# Patient Record
Sex: FEMALE | Race: BLACK OR AFRICAN AMERICAN | HISPANIC OR LATINO
[De-identification: names, ages, dates, MRNs, and addresses within clinical notes are randomized per-mention and may not be internally consistent; named-entity substitution may affect disease eponyms.]

---

## 2018-03-15 ENCOUNTER — TRANSCRIPTION ENCOUNTER (OUTPATIENT)
Age: 43
End: 2018-03-15

## 2023-04-12 ENCOUNTER — NON-APPOINTMENT (OUTPATIENT)
Age: 48
End: 2023-04-12

## 2023-06-19 PROBLEM — Z00.00 ENCOUNTER FOR PREVENTIVE HEALTH EXAMINATION: Status: ACTIVE | Noted: 2023-06-19

## 2023-06-26 ENCOUNTER — APPOINTMENT (OUTPATIENT)
Dept: UROLOGY | Facility: CLINIC | Age: 48
End: 2023-06-26
Payer: MEDICARE

## 2023-06-26 ENCOUNTER — NON-APPOINTMENT (OUTPATIENT)
Age: 48
End: 2023-06-26

## 2023-06-26 VITALS
SYSTOLIC BLOOD PRESSURE: 114 MMHG | HEART RATE: 121 BPM | TEMPERATURE: 97.3 F | BODY MASS INDEX: 32.59 KG/M2 | OXYGEN SATURATION: 97 % | HEIGHT: 60 IN | DIASTOLIC BLOOD PRESSURE: 82 MMHG | WEIGHT: 166 LBS

## 2023-06-26 DIAGNOSIS — R35.0 FREQUENCY OF MICTURITION: ICD-10-CM

## 2023-06-26 PROCEDURE — 81003 URINALYSIS AUTO W/O SCOPE: CPT | Mod: QW

## 2023-06-26 PROCEDURE — 99204 OFFICE O/P NEW MOD 45 MIN: CPT

## 2023-06-26 PROCEDURE — 51798 US URINE CAPACITY MEASURE: CPT

## 2023-06-26 NOTE — REVIEW OF SYSTEMS
[Feeling Tired] : feeling tired [Eyesight Problems] : eyesight problems [see HPI] : see HPI [Limb Weakness] : limb weakness [Difficulty Walking] : difficulty walking [Anxiety] : anxiety [Depression] : depression [Muscle Weakness] : muscle weakness [Feelings Of Weakness] : feelings of weakness [Negative] : Heme/Lymph

## 2023-06-26 NOTE — ADDENDUM
[FreeTextEntry1] : A portion of this note was written by [Stanislav Avina] on 06/26/2023 acting as a scribe for Dr. Rodríguez. \par \par I have personally reviewed the chart and agree that the record accurately reflects my personal performance of the history, physical exam, assessment, and plan.\par

## 2023-06-26 NOTE — ASSESSMENT
[FreeTextEntry1] : I discussed the findings and options with Ms. LILIA MALCOLM in detail.\par \par Options for management of the patient's overactive bladder and incontinence discussed at length. These included medical therapy, behavioral modification, bladder retraining, and surgical options. Surgical options for third line therapies reviewed included injection of botulinum toxin versus sacral nerve stimulation therapy. We discussed anticholinergic vs. beta agonist medications in great detail with risks and benefits.\par \par As she has had no improvement with Anticholinergics we agreed she will start a trial of Beta agonist and if no improvement, she will stop it 2-3 days prior to Urodynamic testing. \par \par Pt will return in office for urodynamics \par \par All of Ms. Malcolm's questions were answered. Patient expressed understanding.\par \par

## 2023-06-26 NOTE — PHYSICAL EXAM

## 2023-06-26 NOTE — HISTORY OF PRESENT ILLNESS
[FreeTextEntry1] : Ms. LILIA MARIE  comes in today for her initial urologic evaluation. Pt is a 49 yo F  with long standing hx of OAB.  Her symptoms have worsened over the last year.  She was seen by Dr. Mario Goins 2 months ago and was started on Trospium which she took for a month.  It did not help her so it was switched to Oxybutynin.  She states this is not helping her either.   She complains of urinary frequency, urgency and UI.  She has 3-4 episodes of nocturia per night.  Pt reports occasionally wearing incontinence pads for protection.   She denies "UTI symmptoms".  She is not sexually active. 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 \par \par \par Sono (performed to assess bladder emptying): 1cc\par Udip: 2023 ---negative\par \par PMH: Mitochondrial disease, HTN, Hypothyroidism, Depression, Anxiety\par PSH: none\par FH: denies \par SocHx: non-smoker, no alcohol\par \par Meds: Amlodipine, Doxazosin, Venlafaxine, Levothyroxine, Drospirenone\par \par Habits: 2 large bottles of water, no caffeine\par

## 2023-06-26 NOTE — LETTER BODY
[Dear  ___] : Dear  [unfilled], [Consult Letter:] : I had the pleasure of evaluating your patient, [unfilled]. [Please see my note below.] : Please see my note below. [Consult Closing:] : Thank you very much for allowing me to participate in the care of this patient.  If you have any questions, please do not hesitate to contact me. [Sincerely,] : Sincerely, [FreeTextEntry3] : Dr. Allison Rodríguez\par

## 2023-06-27 LAB
APPEARANCE: CLEAR
BACTERIA: ABNORMAL /HPF
BILIRUB UR QL STRIP: NORMAL
BILIRUBIN URINE: NEGATIVE
BLOOD URINE: NEGATIVE
CAST: 5 /LPF
CLARITY UR: CLEAR
COLLECTION METHOD: NORMAL
COLOR: NORMAL
EPITHELIAL CELLS: 49 /HPF
GLUCOSE QUALITATIVE U: NEGATIVE MG/DL
GLUCOSE UR-MCNC: NORMAL
HCG UR QL: 0.2 EU/DL
HGB UR QL STRIP.AUTO: NORMAL
KETONES UR-MCNC: NORMAL
KETONES URINE: NEGATIVE MG/DL
LEUKOCYTE ESTERASE UR QL STRIP: NORMAL
LEUKOCYTE ESTERASE URINE: NEGATIVE
MICROSCOPIC-UA: NORMAL
NITRITE UR QL STRIP: NORMAL
NITRITE URINE: NEGATIVE
PH UR STRIP: 5.5
PH URINE: 5.5
PROT UR STRIP-MCNC: NORMAL
PROTEIN URINE: NORMAL MG/DL
RED BLOOD CELLS URINE: 2 /HPF
SP GR UR STRIP: >=1.03
SPECIFIC GRAVITY URINE: 1.03
UROBILINOGEN URINE: 1 MG/DL
WHITE BLOOD CELLS URINE: 2 /HPF

## 2023-06-29 LAB — BACTERIA UR CULT: NORMAL

## 2023-07-06 ENCOUNTER — APPOINTMENT (OUTPATIENT)
Dept: UROLOGY | Facility: CLINIC | Age: 48
End: 2023-07-06
Payer: MEDICARE

## 2023-07-06 VITALS — HEART RATE: 122 BPM | DIASTOLIC BLOOD PRESSURE: 85 MMHG | SYSTOLIC BLOOD PRESSURE: 145 MMHG | OXYGEN SATURATION: 98 %

## 2023-07-06 PROCEDURE — 51701 INSERT BLADDER CATHETER: CPT

## 2023-07-06 NOTE — HISTORY OF PRESENT ILLNESS
[FreeTextEntry1] : Pt is a 49 yo F who presents today for a straight catheterization. Urine culture from  shows >=3 organisms. Probable collection contamination. \par \par Of note: Patient pending urodynamics on 8/3\par \par \par 23--Ms. LILIA MARIE comes in today for her initial urologic evaluation. Pt is a 49 yo F  with long standing hx of OAB. Her symptoms have worsened over the last year. She was seen by Dr. Mario Goins 2 months ago and was started on Trospium which she took for a month. It did not help her so it was switched to Oxybutynin. She states this is not helping her either. She complains of urinary frequency, urgency and UI. She has 3-4 episodes of nocturia per night. Pt reports occasionally wearing incontinence pads for protection. She denies "UTI symmptoms". She is not sexually active. 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 \par \par \par Sono (performed to assess bladder emptying): 1cc\par Udip: 2023 ---negative\par \par PMH: Mitochondrial disease, HTN, Hypothyroidism, Depression, Anxiety\par PSH: none\par FH: denies \par SocHx: non-smoker, no alcohol\par \par Meds: Amlodipine, Doxazosin, Venlafaxine, Levothyroxine, Drospirenone\par \par Habits: 2 large bottles of water, no caffeine\par \par

## 2023-07-06 NOTE — ASSESSMENT
[FreeTextEntry1] : Pt is a 47 yo F who presents today for a straight catheterization. Urine culture from June 26th shows >=3 organisms. Probable collection contamination. \par \par \par Urine culture sent.

## 2023-07-10 LAB — BACTERIA UR CULT: NORMAL

## 2023-08-03 ENCOUNTER — APPOINTMENT (OUTPATIENT)
Dept: UROLOGY | Facility: CLINIC | Age: 48
End: 2023-08-03
Payer: MEDICARE

## 2023-08-03 VITALS
TEMPERATURE: 97.8 F | SYSTOLIC BLOOD PRESSURE: 135 MMHG | DIASTOLIC BLOOD PRESSURE: 89 MMHG | OXYGEN SATURATION: 98 % | HEART RATE: 127 BPM

## 2023-08-03 PROCEDURE — 51728 CYSTOMETROGRAM W/VP: CPT

## 2023-08-03 PROCEDURE — 51797 INTRAABDOMINAL PRESSURE TEST: CPT

## 2023-08-03 PROCEDURE — 81003 URINALYSIS AUTO W/O SCOPE: CPT | Mod: QW

## 2023-08-03 PROCEDURE — 51784 ANAL/URINARY MUSCLE STUDY: CPT

## 2023-08-03 PROCEDURE — 51741 ELECTRO-UROFLOWMETRY FIRST: CPT

## 2023-08-07 LAB
BACTERIA UR CULT: NORMAL
BILIRUB UR QL STRIP: NORMAL
CLARITY UR: CLEAR
COLLECTION METHOD: NORMAL
GLUCOSE UR-MCNC: NORMAL
HCG UR QL: 0.2 EU/DL
HGB UR QL STRIP.AUTO: NORMAL
KETONES UR-MCNC: NORMAL
LEUKOCYTE ESTERASE UR QL STRIP: NORMAL
NITRITE UR QL STRIP: NORMAL
PH UR STRIP: 5.5
PROT UR STRIP-MCNC: NORMAL
SP GR UR STRIP: 1.03

## 2023-08-09 NOTE — ASSESSMENT
[FreeTextEntry1] : Pt is a 47 yo F who presents today for urodynamics. She has a long standing hx of OAB that has worsened over the years. Pt has trialed Trospium and oxybutynin in the past with little to no improvement in her symptoms.   Patient has decreased bladder capacity. Patient experiencing detrusor instability.  The detrusor contractility is normal.   6 weeks samples of Gemtesa provided to patient.  She will f/u in office to assess her symptoms. If no improvement, we will discuss options including SNM and botox.

## 2023-08-09 NOTE — HISTORY OF PRESENT ILLNESS
[FreeTextEntry1] : Pt is a 47 yo F who presents today for urodynamics. She has a long standisng hx of OAB that has worsened over the years. Pt has trialed Trospium and oxybutynin in the past with little to no improvement in her symptoms. She reports 75% improvement in her night time frequency while on Gemtesa,  however her daytime symptoms are still quite bothersome.   23--Pt is a 47 yo F who presents today for a straight catheterization. Urine culture from  shows >=3 organisms. Probable collection contamination.  Of note: Patient pending urodynamics on 8/3  6/26/23--Ms. LILIA MARIE comes in today for her initial urologic evaluation. Pt is a 47 yo F  with long standing hx of OAB. Her symptoms have worsened over the last year. She was seen by Dr. Mario Goins 2 months ago and was started on Trospium which she took for a month. It did not help her so it was switched to Oxybutynin. She states this is not helping her either. She complains of urinary frequency, urgency and UI. She has 3-4 episodes of nocturia per night. Pt reports occasionally wearing incontinence pads for protection. She denies "UTI symmptoms". She is not sexually active.    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      Sono (performed to assess bladder emptying): 1cc  Udip: 2023 ---negative    PMH: Mitochondrial disease, HTN, Hypothyroidism, Depression, Anxiety  PSH: none  FH: denies  SocHx: non-smoker, no alcohol    Meds: Amlodipine, Doxazosin, Venlafaxine, Levothyroxine, Drospirenone    Habits: 2 large bottles of water, no caffeine

## 2023-09-14 ENCOUNTER — APPOINTMENT (OUTPATIENT)
Dept: UROLOGY | Facility: CLINIC | Age: 48
End: 2023-09-14
Payer: MEDICARE

## 2023-09-14 VITALS
DIASTOLIC BLOOD PRESSURE: 82 MMHG | OXYGEN SATURATION: 96 % | HEART RATE: 109 BPM | TEMPERATURE: 97.2 F | SYSTOLIC BLOOD PRESSURE: 136 MMHG

## 2023-09-14 PROCEDURE — 64561 IMPLANT NEUROELECTRODES: CPT | Mod: 50

## 2023-09-19 ENCOUNTER — APPOINTMENT (OUTPATIENT)
Dept: UROLOGY | Facility: CLINIC | Age: 48
End: 2023-09-19
Payer: MEDICARE

## 2023-09-19 VITALS — TEMPERATURE: 97.5 F

## 2023-09-19 PROCEDURE — 99215 OFFICE O/P EST HI 40 MIN: CPT

## 2023-09-26 RX ORDER — VIBEGRON 75 MG/1
75 TABLET, FILM COATED ORAL
Qty: 30 | Refills: 1 | Status: ACTIVE | COMMUNITY
Start: 2023-09-26 | End: 1900-01-01

## 2023-10-05 ENCOUNTER — TRANSCRIPTION ENCOUNTER (OUTPATIENT)
Age: 48
End: 2023-10-05

## 2023-10-05 ENCOUNTER — APPOINTMENT (OUTPATIENT)
Dept: UROLOGY | Facility: CLINIC | Age: 48
End: 2023-10-05
Payer: MEDICARE

## 2023-10-05 PROCEDURE — 99447 NTRPROF PH1/NTRNET/EHR 11-20: CPT

## 2023-10-09 ENCOUNTER — APPOINTMENT (OUTPATIENT)
Dept: UROLOGY | Facility: CLINIC | Age: 48
End: 2023-10-09

## 2023-11-07 ENCOUNTER — APPOINTMENT (OUTPATIENT)
Dept: UROLOGY | Facility: CLINIC | Age: 48
End: 2023-11-07
Payer: MEDICARE

## 2023-11-07 VITALS
DIASTOLIC BLOOD PRESSURE: 67 MMHG | SYSTOLIC BLOOD PRESSURE: 118 MMHG | HEART RATE: 133 BPM | TEMPERATURE: 96.4 F | OXYGEN SATURATION: 98 %

## 2023-11-07 LAB
BILIRUB UR QL STRIP: NORMAL
CLARITY UR: CLEAR
COLLECTION METHOD: NORMAL
GLUCOSE UR-MCNC: NORMAL
HCG UR QL: 0.2 EU/DL
HGB UR QL STRIP.AUTO: NORMAL
KETONES UR-MCNC: NORMAL
LEUKOCYTE ESTERASE UR QL STRIP: NORMAL
NITRITE UR QL STRIP: NORMAL
PH UR STRIP: 5.5
PROT UR STRIP-MCNC: 30
SP GR UR STRIP: >=1.03

## 2023-11-07 PROCEDURE — 99213 OFFICE O/P EST LOW 20 MIN: CPT | Mod: 25

## 2023-11-07 PROCEDURE — 81003 URINALYSIS AUTO W/O SCOPE: CPT | Mod: QW

## 2023-11-07 RX ORDER — VIBEGRON 75 MG/1
75 TABLET, FILM COATED ORAL
Qty: 90 | Refills: 3 | Status: ACTIVE | COMMUNITY
Start: 2023-11-07 | End: 1900-01-01

## 2023-11-09 LAB — BACTERIA UR CULT: NORMAL

## 2023-11-16 ENCOUNTER — APPOINTMENT (OUTPATIENT)
Dept: UROLOGY | Facility: CLINIC | Age: 48
End: 2023-11-16

## 2024-01-11 ENCOUNTER — NON-APPOINTMENT (OUTPATIENT)
Age: 49
End: 2024-01-11

## 2024-01-22 ENCOUNTER — APPOINTMENT (OUTPATIENT)
Dept: UROLOGY | Facility: CLINIC | Age: 49
End: 2024-01-22
Payer: MEDICAID

## 2024-01-22 PROCEDURE — 99213 OFFICE O/P EST LOW 20 MIN: CPT | Mod: 95

## 2024-01-30 ENCOUNTER — APPOINTMENT (OUTPATIENT)
Dept: UROLOGY | Facility: CLINIC | Age: 49
End: 2024-01-30

## 2024-02-06 NOTE — ADDENDUM
[FreeTextEntry1] : A portion of this note was written by [Stanislav Avina] on 01/22/2024 acting as a scribe for Dr. Rodríguez.   I have personally reviewed the chart and agree that the record accurately reflects my personal performance of the history, physical exam, assessment, and plan.

## 2024-02-06 NOTE — HISTORY OF PRESENT ILLNESS
[Other Location: e.g. School (Enter Location, City,State)___] : at [unfilled], at the time of the visit. [FreeTextEntry1] : 2024 -- Pt is a 47 yo F with longstanding hx of OAB, currently on Gemtesa. She presents today for a telehealth f/u visit.   She is satisfied and well managed with Gemtesa with no side effects.  Pt describes 50% improvement--decreased nocturia (x1-2). She has no new urinary complaints.   UC: 23-- negative   2023 -- Pt is a 47 yo F with longstanding hx of OAB. She reports intermittent improvement with Gemtesa. However, she is still bothered by urinary frequency at night. note--L sided swelling being followed by PCP (likely due to amlodipine, doxycycline as per pt).    PVR: 0cc (done to rule out incomplete bladder emptying)   10/5/23-- 47 yo female pt present to discuss the findings of her voiding diary. She has a history of an overactive bladder. Her voiding diary shows that she is voiding 2 times at night, but as of recently she states that she has been sleeping through the night. She is not currently on the Gemtesa. She states that her body lately has seemed "quieter". She is not interested in the interstim device. The pt is interested in Botox.   2023 -- Ms. LILIA MARIE comes in today for her Urologic follow up. Pt is a 47 yo F with longstanding hx of OAB. She had a PNE placed on 23 and returns today to have the leads removed and discuss next steps.  She reports some improvement with her nighttime symptoms. Of the 5 days she had 2 nights with nocturia x1, one night nocturia x2 and 2 nights with nocturia x3, (was 5x in the past).  2023 --- Ms. LILIA MARIE comes in today for her Urologic follow up. Pt is a 47 yo F with longstanding hx of OAB that has worsened over the years. She presents today for percutaneous nerve evaluation (PNE) to check if she's a good candidate for SNM.   8/3/23-- Pt is a 47 yo F who presents today for urodynamics. She has a long standisng hx of OAB that has worsened over the years. Pt has trialed Trospium and oxybutynin in the past with little to no improvement in her symptoms. She reports 75% improvement in her night time frequency while on Gemtesa, however her daytime symptoms are still quite bothersome.  23--Pt is a 47 yo F who presents today for a straight catheterization. Urine culture from  shows >=3 organisms. Probable collection contamination.  Of note: Patient pending urodynamics on 8/3  6/26/23--Ms. LILIA MARIE comes in today for her initial urologic evaluation. Pt is a 47 yo F  with long standing hx of OAB. Her symptoms have worsened over the last year. She was seen by Dr. Mario Goins 2 months ago and was started on Trospium which she took for a month. It did not help her so it was switched to Oxybutynin. She states this is not helping her either. She complains of urinary frequency, urgency and UI. She has 3-4 episodes of nocturia per night. Pt reports occasionally wearing incontinence pads for protection. She denies "UTI symmptoms". She is not sexually active.  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  Sono (performed to assess bladder emptying): 1cc  Udip: 2023 ---negative  PMH: Mitochondrial disease, HTN, Hypothyroidism, Depression, Anxiety PSH: none FH: denies SocHx: non-smoker, no alcohol Meds: Amlodipine, Doxazosin, Venlafaxine, Levothyroxine, Drospirenone Habits: 2 large bottles of water, no caffeine

## 2024-02-06 NOTE — ASSESSMENT
[FreeTextEntry1] : I discussed the findings and options with Ms. LILIA MARIE in detail.  We agreed that she will continue Gemtesa to manage her OAB. Risks/benefits reviewed.  -pt will reach out to the office if she needs a renewal Rx.  She will plan to return in 6 months for re-assessment, or sooner if clinically indicated.  Patient expressed understanding.

## 2024-07-09 ENCOUNTER — APPOINTMENT (OUTPATIENT)
Dept: UROLOGY | Facility: CLINIC | Age: 49
End: 2024-07-09